# Patient Record
Sex: MALE | Race: WHITE
[De-identification: names, ages, dates, MRNs, and addresses within clinical notes are randomized per-mention and may not be internally consistent; named-entity substitution may affect disease eponyms.]

---

## 2021-04-15 ENCOUNTER — TRANSCRIPTION ENCOUNTER (OUTPATIENT)
Age: 26
End: 2021-04-15

## 2021-05-24 ENCOUNTER — TRANSCRIPTION ENCOUNTER (OUTPATIENT)
Age: 26
End: 2021-05-24

## 2023-08-25 ENCOUNTER — APPOINTMENT (OUTPATIENT)
Dept: ORTHOPEDIC SURGERY | Facility: CLINIC | Age: 28
End: 2023-08-25
Payer: COMMERCIAL

## 2023-08-25 VITALS — BODY MASS INDEX: 24.34 KG/M2 | WEIGHT: 170 LBS | HEIGHT: 70 IN

## 2023-08-25 DIAGNOSIS — Z78.9 OTHER SPECIFIED HEALTH STATUS: ICD-10-CM

## 2023-08-25 DIAGNOSIS — M54.42 LUMBAGO WITH SCIATICA, LEFT SIDE: ICD-10-CM

## 2023-08-25 DIAGNOSIS — M54.41 LUMBAGO WITH SCIATICA, LEFT SIDE: ICD-10-CM

## 2023-08-25 PROBLEM — Z00.00 ENCOUNTER FOR PREVENTIVE HEALTH EXAMINATION: Status: ACTIVE | Noted: 2023-08-25

## 2023-08-25 PROCEDURE — 99203 OFFICE O/P NEW LOW 30 MIN: CPT | Mod: 25

## 2023-08-25 PROCEDURE — 72100 X-RAY EXAM L-S SPINE 2/3 VWS: CPT

## 2023-08-25 PROCEDURE — 96372 THER/PROPH/DIAG INJ SC/IM: CPT

## 2023-08-25 PROCEDURE — 72170 X-RAY EXAM OF PELVIS: CPT

## 2023-08-25 RX ORDER — CYCLOBENZAPRINE HYDROCHLORIDE 10 MG/1
10 TABLET, FILM COATED ORAL
Qty: 7 | Refills: 0 | Status: ACTIVE | COMMUNITY
Start: 2023-08-25 | End: 1900-01-01

## 2023-08-25 RX ORDER — METHYLPREDNISOLONE 4 MG/1
4 TABLET ORAL
Qty: 1 | Refills: 0 | Status: ACTIVE | COMMUNITY
Start: 2023-08-25 | End: 1900-01-01

## 2023-08-25 NOTE — REASON FOR VISIT
[FreeTextEntry2] : This is a 28 year old M with acute lower back pain since 8/19/23 after bending down.  He had trouble walking the following days.  There is pain in the right hamstring.  No numbness, bowel/bladder changes, groin pain.  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  NSAIDs have helped some.

## 2023-08-25 NOTE — ASSESSMENT
[FreeTextEntry1] : We reviewed the findings and the history. Questions were answered and concerns addressed. The options were outlined. Toradol injection today. MDP and Flexeril. Consider MRI. He is traveling in the coming weeks.  Progress note completed by Angelic Morales PAC. Patient seen by Angelic Morales PAC.

## 2023-08-25 NOTE — HISTORY OF PRESENT ILLNESS
[8] : 8 [4] : 4 [FreeTextEntry1] : low back  [FreeTextEntry5] : pt states he bent down and picked something up on Saturday 08/19/23 and felt pain in his lower back

## 2023-08-25 NOTE — PROCEDURE
[Therapeutic Injection] : therapeutic injection [Left] : of the left [Other: ____] : [unfilled] [Pain] : pain [Inflammation] : inflammation [Alcohol] : alcohol [___ cc    1%] : Lidocaine ~Vcc of 1%  [___ cc    30mg] : Ketorolac (Toradol) ~Vcc of 30 mg

## 2023-09-25 ENCOUNTER — APPOINTMENT (OUTPATIENT)
Dept: ORTHOPEDIC SURGERY | Facility: CLINIC | Age: 28
End: 2023-09-25